# Patient Record
Sex: FEMALE | Race: OTHER | URBAN - METROPOLITAN AREA
[De-identification: names, ages, dates, MRNs, and addresses within clinical notes are randomized per-mention and may not be internally consistent; named-entity substitution may affect disease eponyms.]

---

## 2023-05-30 ENCOUNTER — OFFICE VISIT (OUTPATIENT)
Dept: FAMILY MEDICINE CLINIC | Facility: CLINIC | Age: 23
End: 2023-05-30

## 2023-05-30 VITALS
SYSTOLIC BLOOD PRESSURE: 110 MMHG | RESPIRATION RATE: 16 BRPM | DIASTOLIC BLOOD PRESSURE: 67 MMHG | WEIGHT: 129 LBS | BODY MASS INDEX: 24.99 KG/M2 | TEMPERATURE: 98 F | HEART RATE: 78 BPM | HEIGHT: 60.24 IN

## 2023-05-30 DIAGNOSIS — R09.81 NASAL CONGESTION: ICD-10-CM

## 2023-05-30 DIAGNOSIS — Z01.419 ENCOUNTER FOR ANNUAL ROUTINE GYNECOLOGICAL EXAMINATION: ICD-10-CM

## 2023-05-30 DIAGNOSIS — Z00.00 ROUTINE PHYSICAL EXAMINATION: Primary | ICD-10-CM

## 2023-05-30 PROCEDURE — 3074F SYST BP LT 130 MM HG: CPT | Performed by: FAMILY MEDICINE

## 2023-05-30 PROCEDURE — 3008F BODY MASS INDEX DOCD: CPT | Performed by: FAMILY MEDICINE

## 2023-05-30 PROCEDURE — 99385 PREV VISIT NEW AGE 18-39: CPT | Performed by: FAMILY MEDICINE

## 2023-05-30 PROCEDURE — 3078F DIAST BP <80 MM HG: CPT | Performed by: FAMILY MEDICINE

## 2023-05-30 RX ORDER — GARLIC EXTRACT 500 MG
1 CAPSULE ORAL DAILY
COMMUNITY

## 2023-05-31 LAB
C TRACH DNA SPEC QL NAA+PROBE: NEGATIVE
N GONORRHOEA DNA SPEC QL NAA+PROBE: NEGATIVE

## 2023-06-09 LAB
LAST PAP RESULT: NORMAL
PAP HISTORY (OTHER THAN LAST PAP): NORMAL

## 2023-06-13 ENCOUNTER — OFFICE VISIT (OUTPATIENT)
Dept: FAMILY MEDICINE CLINIC | Facility: CLINIC | Age: 23
End: 2023-06-13

## 2023-06-13 VITALS
SYSTOLIC BLOOD PRESSURE: 112 MMHG | HEART RATE: 80 BPM | TEMPERATURE: 98 F | DIASTOLIC BLOOD PRESSURE: 71 MMHG | RESPIRATION RATE: 16 BRPM

## 2023-06-13 DIAGNOSIS — J02.9 PHARYNGITIS, UNSPECIFIED ETIOLOGY: Primary | ICD-10-CM

## 2023-06-13 LAB
CONTROL LINE PRESENT WITH A CLEAR BACKGROUND (YES/NO): YES YES/NO
KIT LOT #: NORMAL NUMERIC
STREP GRP A CUL-SCR: POSITIVE

## 2023-06-13 PROCEDURE — 87880 STREP A ASSAY W/OPTIC: CPT | Performed by: FAMILY MEDICINE

## 2023-06-13 PROCEDURE — 3074F SYST BP LT 130 MM HG: CPT | Performed by: FAMILY MEDICINE

## 2023-06-13 PROCEDURE — 99214 OFFICE O/P EST MOD 30 MIN: CPT | Performed by: FAMILY MEDICINE

## 2023-06-13 PROCEDURE — 3078F DIAST BP <80 MM HG: CPT | Performed by: FAMILY MEDICINE

## 2023-06-13 RX ORDER — AZITHROMYCIN 250 MG/1
TABLET, FILM COATED ORAL
Qty: 6 TABLET | Refills: 0 | Status: SHIPPED | OUTPATIENT
Start: 2023-06-13 | End: 2023-06-18

## 2023-08-11 ENCOUNTER — TELEPHONE (OUTPATIENT)
Dept: OBGYN CLINIC | Facility: CLINIC | Age: 23
End: 2023-08-11

## 2023-08-11 NOTE — TELEPHONE ENCOUNTER
New Pt made 15-min appt in [facility] on 8/22 to take out my nexplanon implant and switch over to a non-hormonal BC pill.      Pls advise if an issue

## 2023-08-11 NOTE — TELEPHONE ENCOUNTER
Name and  verified. Spoke with patient and told her we needed 30 minute slot for appointment. Patient rescheduled for  at 1:45. Patient verbalizes understanding.

## 2023-08-21 ENCOUNTER — OFFICE VISIT (OUTPATIENT)
Dept: FAMILY MEDICINE CLINIC | Facility: CLINIC | Age: 23
End: 2023-08-21

## 2023-08-21 VITALS
TEMPERATURE: 98 F | DIASTOLIC BLOOD PRESSURE: 65 MMHG | RESPIRATION RATE: 16 BRPM | SYSTOLIC BLOOD PRESSURE: 100 MMHG | HEART RATE: 69 BPM | BODY MASS INDEX: 24 KG/M2 | WEIGHT: 125 LBS

## 2023-08-21 DIAGNOSIS — G43.109 MIGRAINE WITH AURA AND WITHOUT STATUS MIGRAINOSUS, NOT INTRACTABLE: Primary | ICD-10-CM

## 2023-08-21 PROCEDURE — 99214 OFFICE O/P EST MOD 30 MIN: CPT | Performed by: FAMILY MEDICINE

## 2023-08-21 PROCEDURE — 3074F SYST BP LT 130 MM HG: CPT | Performed by: FAMILY MEDICINE

## 2023-08-21 PROCEDURE — 3078F DIAST BP <80 MM HG: CPT | Performed by: FAMILY MEDICINE

## 2023-08-21 RX ORDER — SUMATRIPTAN 25 MG/1
25 TABLET, FILM COATED ORAL EVERY 2 HOUR PRN
Qty: 9 TABLET | Refills: 1 | Status: SHIPPED | OUTPATIENT
Start: 2023-08-21

## 2023-08-21 RX ORDER — CYCLOBENZAPRINE HCL 5 MG
5 TABLET ORAL NIGHTLY PRN
Qty: 14 TABLET | Refills: 0 | Status: SHIPPED | OUTPATIENT
Start: 2023-08-21

## 2023-08-22 ENCOUNTER — OFFICE VISIT (OUTPATIENT)
Dept: OBGYN CLINIC | Facility: CLINIC | Age: 23
End: 2023-08-22

## 2023-08-22 VITALS
WEIGHT: 125 LBS | HEART RATE: 77 BPM | HEIGHT: 60 IN | BODY MASS INDEX: 24.54 KG/M2 | SYSTOLIC BLOOD PRESSURE: 118 MMHG | DIASTOLIC BLOOD PRESSURE: 77 MMHG

## 2023-08-22 DIAGNOSIS — Z30.46 ENCOUNTER FOR REMOVAL OF SUBDERMAL CONTRACEPTIVE IMPLANT: Primary | ICD-10-CM

## 2023-08-22 DIAGNOSIS — Z30.09 GENERAL COUNSELING FOR PRESCRIPTION OF ORAL CONTRACEPTIVES: ICD-10-CM

## 2023-08-22 PROCEDURE — 3074F SYST BP LT 130 MM HG: CPT | Performed by: ADVANCED PRACTICE MIDWIFE

## 2023-08-22 PROCEDURE — 11982 REMOVE DRUG IMPLANT DEVICE: CPT | Performed by: ADVANCED PRACTICE MIDWIFE

## 2023-08-22 PROCEDURE — 3078F DIAST BP <80 MM HG: CPT | Performed by: ADVANCED PRACTICE MIDWIFE

## 2023-08-22 PROCEDURE — 3008F BODY MASS INDEX DOCD: CPT | Performed by: ADVANCED PRACTICE MIDWIFE

## 2023-08-22 RX ORDER — NORETHINDRONE ACETATE AND ETHINYL ESTRADIOL .03; 1.5 MG/1; MG/1
1 TABLET ORAL DAILY
Qty: 28 TABLET | Refills: 11 | Status: SHIPPED | OUTPATIENT
Start: 2023-08-22 | End: 2024-08-21

## 2023-08-22 NOTE — PROCEDURES
Nexplanon Removal      Birth control method(s) used:  nexplanon   Verbal Consent was obtained from the patient. LMP 8/13 wnl    Removal:  1 % lidocaine was injected underneath the tip of the Nexplanon dick that is closest to the left elbow. Nexplanon was located by palpation. 3 mm incision was made at the tip of the dick closest to the elbow. Nexplanon was pushed toward the incision. Nexplanon dick was bluntly dissected from the tissue sheath. The entire Nexplanon dick was removed. Visit Plan:  Steri-Strips were applied to the skin incision. An Ace bandage was wrapped around the injected arm. Both Physician and pt confirmed device by tactile feel. Patient was instructed to remove Ace bandage in 24 hours. Patient was instructed to remove Steri-Strips in 7 days. Risks of OCP's especially DVT, elevated blood pressure, and failure resulting in pregnancy were reviewed. Increased risk for heart attack and stroke especially with tobacco use was also discussed. Potential side effects and non-contraceptive benefits were reviewed. She was also counseled on the use of condoms to decrease the risk of pregnancy and sexually transmitted diseases. \"ACHES\" reviewed    Diagnoses and all orders for this visit:    Encounter for removal of subdermal contraceptive implant  -     REMOVAL, NON-BIODEGRADABLE DRUG DELIVERY IMPLANT    General counseling for prescription of oral contraceptives    Other orders  -     Norethindrone Acet-Ethinyl Est (Winston Arias 1.5/30, 21,) 1.5-30 MG-MCG Oral Tab; Take 1 tablet by mouth daily.

## 2023-09-07 ENCOUNTER — TELEPHONE (OUTPATIENT)
Dept: OBGYN CLINIC | Facility: CLINIC | Age: 23
End: 2023-09-07

## 2023-09-07 RX ORDER — NORETHINDRONE ACETATE AND ETHINYL ESTRADIOL .03; 1.5 MG/1; MG/1
1 TABLET ORAL DAILY
Qty: 28 TABLET | Refills: 11 | OUTPATIENT
Start: 2023-09-07 | End: 2024-09-06

## 2023-10-10 ENCOUNTER — OFFICE VISIT (OUTPATIENT)
Dept: OBGYN CLINIC | Facility: CLINIC | Age: 23
End: 2023-10-10

## 2023-10-10 VITALS
HEART RATE: 67 BPM | HEIGHT: 60 IN | SYSTOLIC BLOOD PRESSURE: 110 MMHG | WEIGHT: 123 LBS | BODY MASS INDEX: 24.15 KG/M2 | DIASTOLIC BLOOD PRESSURE: 76 MMHG

## 2023-10-10 DIAGNOSIS — Z32.00 PREGNANCY EXAMINATION OR TEST, PREGNANCY UNCONFIRMED: ICD-10-CM

## 2023-10-10 DIAGNOSIS — Z30.017 INSERTION OF IMPLANTABLE SUBDERMAL CONTRACEPTIVE: Primary | ICD-10-CM

## 2023-10-10 LAB
CONTROL LINE PRESENT WITH A CLEAR BACKGROUND (YES/NO): YES YES/NO
PREGNANCY TEST, URINE: NEGATIVE

## 2023-10-10 PROCEDURE — 81025 URINE PREGNANCY TEST: CPT | Performed by: ADVANCED PRACTICE MIDWIFE

## 2023-10-10 PROCEDURE — 3078F DIAST BP <80 MM HG: CPT | Performed by: ADVANCED PRACTICE MIDWIFE

## 2023-10-10 PROCEDURE — 3008F BODY MASS INDEX DOCD: CPT | Performed by: ADVANCED PRACTICE MIDWIFE

## 2023-10-10 PROCEDURE — 11981 INSERTION DRUG DLVR IMPLANT: CPT | Performed by: ADVANCED PRACTICE MIDWIFE

## 2023-10-10 PROCEDURE — 3074F SYST BP LT 130 MM HG: CPT | Performed by: ADVANCED PRACTICE MIDWIFE

## 2023-10-10 NOTE — PROCEDURES
Nexplanon Insertion/Removal    Pregnancy Results: negative from urine test   Birth control method(s) used:  ; OCPs   Consent was obtained from the patient. Insertion:    The patient was positioned with her left arm flexed. Measurement was taken from her epicondyle approximately 8 cm and marked 4 cm apart from the orginal gertrude. 1% lidocaine with epinephrine  was used to inject the planned insertion site. Device opened, dick confirmed within device. Lateral traction of skin performed while Nexplanon inserted. The Nexplanon was placed 8 cm from the epicondyle without difficulty. The ridged portion of the applicator was seen once the device was withdrawn. Visit Plan:  Steri-Strips were applied to the skin incision. An Ace bandage was wrapped around the injected arm. Both Physician and pt confirmed device by tactile feel. Patient was instructed to remove Ace bandage in 24 hours. Patient was instructed to remove Steri-Strips in 7 days. All of the patient's questions were addressed.   Nexplanon info card was given to the patient with expiration

## 2024-03-04 RX ORDER — SUMATRIPTAN 25 MG/1
25 TABLET, FILM COATED ORAL EVERY 2 HOUR PRN
Qty: 9 TABLET | Refills: 1 | Status: SHIPPED | OUTPATIENT
Start: 2024-03-04

## 2024-03-05 NOTE — TELEPHONE ENCOUNTER
Refill passed per [facility] protocol.     Requested Prescriptions   Pending Prescriptions Disp Refills    SUMAtriptan (IMITREX) 25 MG Oral Tab 9 tablet 1     Sig: Take 1 tablet (25 mg total) by mouth every 2 (two) hours as needed for Migraine. Use at onset; repeat once after 2 HRS-ONLY 2 IN 24 HR MAX       Neurology Medications Passed - 3/2/2024  8:48 AM        Passed - In person appointment or virtual visit in the past 6 mos or appointment in next 3 mos     Recent Outpatient Visits              4 months ago [place]able subdermal [place]    [facility]    6 months ago Encounter for removal of subdermal contraceptive implant    [facility] Claudine Rodriguez CN    Office Visit    6 months ago Migraine with aura and without status migrainosus, not intractable    [facility] Weiler, Colleen M,     Office Visit    8 months ago Pharyngitis, unspecified etiology    [facility] Weiler, Colleen M,     Office Visit    9 months ago Routine physical examination    [facility] Weiler, Colleen M, DO    Office Visit          Future Appointments         Provider Department Appt Notes    In 1 week Weiler, Colleen M, DO [facility] I've been having trouble with my skin Lots of breakouts/acne which is extremely uncommon for me                     Future Appointments         Provider Department Appt Notes    In 1 week Weiler, Colleen M,  [facility] I've been having trouble with my skin Lots of breakouts/acne which is extremely uncommon for me             Recent Outpatient Visits              4 months ago Insertion of implantable subdermal contraceptive    [place]  [facility] Claudine Rodriguez CNM    Office Visit    6 months ago Encounter for removal of subdermal contraceptive implant    [facility] [place] [facility]    Office Visit    6 months ago Migraine with aura and without status migrainosus, not intractable    [facility] [place] [facility] [place] [place]    Office Visit    8 months ago Pharyngitis, unspecified etiology    [facility] [place] [facility] [place] M, DO    Office Visit    9 months ago Routine physical examination    [facility] [place] [facility] [place] Col[facility] M, DO    Office Visit

## 2024-03-12 ENCOUNTER — OFFICE VISIT (OUTPATIENT)
Dept: FAMILY MEDICINE CLINIC | Facility: CLINIC | Age: 24
End: 2024-03-12
Payer: MEDICAID

## 2024-03-12 ENCOUNTER — LAB ENCOUNTER (OUTPATIENT)
Dept: LAB | Age: 24
End: 2024-03-12
Attending: FAMILY MEDICINE
Payer: MEDICAID

## 2024-03-12 VITALS
RESPIRATION RATE: 16 BRPM | SYSTOLIC BLOOD PRESSURE: 111 MMHG | BODY MASS INDEX: 24 KG/M2 | HEART RATE: 78 BPM | DIASTOLIC BLOOD PRESSURE: 74 MMHG | WEIGHT: 121 LBS | TEMPERATURE: 98 F

## 2024-03-12 DIAGNOSIS — F41.9 ANXIETY: ICD-10-CM

## 2024-03-12 DIAGNOSIS — L70.0 ACNE VULGARIS: Primary | ICD-10-CM

## 2024-03-12 DIAGNOSIS — R63.4 WEIGHT LOSS: ICD-10-CM

## 2024-03-12 DIAGNOSIS — M54.2 NECK PAIN: ICD-10-CM

## 2024-03-12 LAB
ANION GAP SERPL CALC-SCNC: 7 MMOL/L (ref 0–18)
BUN BLD-MCNC: 9 MG/DL (ref 9–23)
BUN/CREAT SERPL: 11.4 (ref 10–20)
CALCIUM BLD-MCNC: 9.8 MG/DL (ref 8.7–10.4)
CHLORIDE SERPL-SCNC: 108 MMOL/L (ref 98–112)
CO2 SERPL-SCNC: 26 MMOL/L (ref 21–32)
CREAT BLD-MCNC: 0.79 MG/DL
DEPRECATED RDW RBC AUTO: 38.5 FL (ref 35.1–46.3)
EGFRCR SERPLBLD CKD-EPI 2021: 106 ML/MIN/1.73M2 (ref 60–?)
ERYTHROCYTE [DISTWIDTH] IN BLOOD BY AUTOMATED COUNT: 11.9 % (ref 11–15)
FASTING STATUS PATIENT QL REPORTED: YES
GLUCOSE BLD-MCNC: 89 MG/DL (ref 70–99)
HCT VFR BLD AUTO: 40.7 %
HGB BLD-MCNC: 13.8 G/DL
MCH RBC QN AUTO: 29.7 PG (ref 26–34)
MCHC RBC AUTO-ENTMCNC: 33.9 G/DL (ref 31–37)
MCV RBC AUTO: 87.7 FL
OSMOLALITY SERPL CALC.SUM OF ELEC: 290 MOSM/KG (ref 275–295)
PLATELET # BLD AUTO: 319 10(3)UL (ref 150–450)
POTASSIUM SERPL-SCNC: 4 MMOL/L (ref 3.5–5.1)
RBC # BLD AUTO: 4.64 X10(6)UL
SODIUM SERPL-SCNC: 141 MMOL/L (ref 136–145)
TSI SER-ACNC: 1.75 MIU/ML (ref 0.55–4.78)
VIT D+METAB SERPL-MCNC: 14 NG/ML (ref 30–100)
WBC # BLD AUTO: 4.5 X10(3) UL (ref 4–11)

## 2024-03-12 PROCEDURE — 82306 VITAMIN D 25 HYDROXY: CPT

## 2024-03-12 PROCEDURE — 84443 ASSAY THYROID STIM HORMONE: CPT

## 2024-03-12 PROCEDURE — 90715 TDAP VACCINE 7 YRS/> IM: CPT | Performed by: FAMILY MEDICINE

## 2024-03-12 PROCEDURE — 90471 IMMUNIZATION ADMIN: CPT | Performed by: FAMILY MEDICINE

## 2024-03-12 PROCEDURE — 85027 COMPLETE CBC AUTOMATED: CPT

## 2024-03-12 PROCEDURE — 80048 BASIC METABOLIC PNL TOTAL CA: CPT

## 2024-03-12 PROCEDURE — 36415 COLL VENOUS BLD VENIPUNCTURE: CPT

## 2024-03-12 PROCEDURE — 99214 OFFICE O/P EST MOD 30 MIN: CPT | Performed by: FAMILY MEDICINE

## 2024-03-12 NOTE — PROGRESS NOTES
HPI: Kalyn is a 23 year old female who presents for acne for the past few months.  New problem for her.  Using Cerave daily.     Pt has noticed she has been losing more weight.  Has not changed the way she eats. Has not changed exercise routine.  Weight trains 4 times a week.     Feels like something is off.     On Nexplanon. Does not get periods.     Has increased stress.  Has more work responsibility. Getting more tension on the left side of neck. Pt is interested in talking to someone.  Affecting her day to day activities. Unsure if she needs medication.     PMH:  No past medical history on file.   Alg:  Patient has no known allergies.   Meds:   Current Outpatient Medications on File Prior to Visit   Medication Sig Dispense Refill    SUMAtriptan (IMITREX) 25 MG Oral Tab Take 1 tablet (25 mg total) by mouth every 2 (two) hours as needed for Migraine. Use at onset; repeat once after 2 HRS-ONLY 2 IN 24 HR MAX 9 tablet 1     No current facility-administered medications on file prior to visit.      Tobacco Use: no    ROS: see HPI    Objective:   Gen: AOx3. NAD.   /74   Pulse 78   Temp 98.4 °F (36.9 °C) (Temporal)   Resp 16   Wt 121 lb (54.9 kg)   LMP 08/16/2023   BMI 23.63 kg/m²   Neck: no LAD noted  CV:  Regular rate and rhythm; no murmurs  Lungs:  Clear to ausculation; good aeration               No wheezes, rales or rhonchi      Assessment:/Plan:  Encounter Diagnoses   Name Primary?    Acne vulgaris    New breakouts.  Advised product with benzyl peroxide. Refer to Dermatology placed.   Yes    Weight loss    Unclear etiology.  No other concerning s/s. Will check labs.        Neck pain    Seems to be contributing to headaches.  Will get PT on neck and shoulders.        Anxiety    Worsening due to stress at work.  Will refer to [facility] for therapy.       Colleen Weiler,

## 2024-03-15 DIAGNOSIS — E55.9 VITAMIN D DEFICIENCY: Primary | ICD-10-CM

## 2024-03-15 RX ORDER — ERGOCALCIFEROL 1.25 MG/1
50000 CAPSULE ORAL WEEKLY
Qty: 8 CAPSULE | Refills: 0 | Status: SHIPPED | OUTPATIENT
Start: 2024-03-15 | End: 2024-04-14

## 2024-06-03 ENCOUNTER — OFFICE VISIT (OUTPATIENT)
Dept: FAMILY MEDICINE CLINIC | Facility: CLINIC | Age: 24
End: 2024-06-03

## 2024-06-03 VITALS
TEMPERATURE: 98 F | BODY MASS INDEX: 27 KG/M2 | RESPIRATION RATE: 16 BRPM | HEART RATE: 77 BPM | SYSTOLIC BLOOD PRESSURE: 107 MMHG | WEIGHT: 137 LBS | DIASTOLIC BLOOD PRESSURE: 68 MMHG

## 2024-06-03 DIAGNOSIS — J02.0 STREP PHARYNGITIS: Primary | ICD-10-CM

## 2024-06-03 PROCEDURE — 99214 OFFICE O/P EST MOD 30 MIN: CPT | Performed by: FAMILY MEDICINE

## 2024-06-03 PROCEDURE — 87880 STREP A ASSAY W/OPTIC: CPT | Performed by: FAMILY MEDICINE

## 2024-06-03 RX ORDER — SUMATRIPTAN 25 MG/1
25 TABLET, FILM COATED ORAL EVERY 2 HOUR PRN
Qty: 9 TABLET | Refills: 1 | Status: SHIPPED | OUTPATIENT
Start: 2024-06-03

## 2024-06-03 RX ORDER — AMOXICILLIN 500 MG/1
500 CAPSULE ORAL 2 TIMES DAILY
Qty: 20 CAPSULE | Refills: 0 | Status: SHIPPED | OUTPATIENT
Start: 2024-06-03 | End: 2024-06-13

## 2024-06-03 NOTE — PROGRESS NOTES
HPI: Kalyn is a 23 year old female who presents for pharyngitis. Started few days ago. Now has mild fevers. No other symptoms.     PMH:  No past medical history on file.   Alg:  Patient has no known allergies.   Meds:   Current Outpatient Medications on File Prior to Visit   Medication Sig Dispense Refill    SUMAtriptan (IMITREX) 25 MG Oral Tab Take 1 tablet (25 mg total) by mouth every 2 (two) hours as needed for Migraine. Use at onset; repeat once after 2 HRS-ONLY 2 IN 24 HR MAX 9 tablet 1     No current facility-administered medications on file prior to visit.      Tobacco Use: no    Objective:   Gen: AOx3. NAD.  /68   Pulse 77   Temp 98.4 °F (36.9 °C) (Temporal)   Resp 16   Wt 137 lb (62.1 kg)   LMP 08/16/2023   BMI 26.76 kg/m²   HEENT: Conjunctive clear.  Guerrero ear canals clear.  Guerrero TMs intact with good landmarks noted.  Nares patent.  Oral mucous membrane moist.  Normal lips, teeth, and gums.  Oropharynx erythematous.   Neck supple.  Good ROM.  No LAD.   CV:  Regular rate and rhythm; no murmurs  Lungs:  Clear to ausculation; good aeration               No wheezes, rales or rhonchi        Assessment:/Plan:  Encounter Diagnosis   Name Primary?    Strep pharyngitis Yes       Rapid strep positive. Start Amoxicillin.  Encouraged fluids.  Tylenol/Motrin for pain relief and fever.  Soft foods, cool liquids.  Patient is contagious until on antibiotics for 24 hours.  No sharing foods or liquids.  After 24 hours on antibiotics, dispose of toothbrush.  F/U if worsens.    Colleen Weiler,

## 2024-06-06 ENCOUNTER — TELEPHONE (OUTPATIENT)
Dept: FAMILY MEDICINE CLINIC | Facility: CLINIC | Age: 24
End: 2024-06-06

## 2024-06-06 NOTE — TELEPHONE ENCOUNTER
Patient calling today, was seen 6/3/24 in clinic and tested positive for strep. She is 3 days in of taking amoxicillin. She woke up this morning with low grade fever 99.7 and she has been taking tylenol after this to help. She initially had fever when symptoms first started but then it went away. She was concerned today when fever came back. She is feeling ok. Now has runny nose, new symptoms since seen but no other complaints.     Advised fluids, rest and if fever spikes above 100 and is staying there with OTC meds and ABT then she should call us back. Also advised if any other symptoms develop, patient can call us so we can discuss plan. She will continue the ABT given.     Patient agreeable to give this some time. 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     FYI to PCP as update and in case any other recommendations to make.

## 2024-06-24 ENCOUNTER — TELEMEDICINE (OUTPATIENT)
Dept: FAMILY MEDICINE CLINIC | Facility: CLINIC | Age: 24
End: 2024-06-24

## 2024-06-24 DIAGNOSIS — N89.8 VAGINAL DISCHARGE: Primary | ICD-10-CM

## 2024-06-24 PROCEDURE — 99213 OFFICE O/P EST LOW 20 MIN: CPT | Performed by: FAMILY MEDICINE

## 2024-06-24 RX ORDER — FLUCONAZOLE 150 MG/1
150 TABLET ORAL ONCE
Qty: 2 TABLET | Refills: 0 | Status: SHIPPED | OUTPATIENT
Start: 2024-06-24 | End: 2024-06-24

## 2024-06-24 NOTE — PROGRESS NOTES
HPI: Kalyn is a 23 year old female who presents for yeast infection. Started few days ago as clumpy discharge which looked like cottage cheese.  Took Monistat for 3 days and she still has symptoms. Sexually active with one partner.  No new partners.     PMH:  No past medical history on file.   Alg:  Patient has no known allergies.   Meds:   Current Outpatient Medications on File Prior to Visit   Medication Sig Dispense Refill    SUMAtriptan (IMITREX) 25 MG Oral Tab Take 1 tablet (25 mg total) by mouth every 2 (two) hours as needed for Migraine. Use at onset; repeat once after 2 HRS-ONLY 2 IN 24 HR MAX 9 tablet 1     No current facility-administered medications on file prior to visit.      Tobacco Use: no    Objective:   Gen: AOx3. NAD  LMP 08/16/2023       Assessment:/Plan:  Encounter Diagnosis   Name Primary?    Vaginal discharge Yes       Suspect yeast infection.  Start Diflucan.  Repeat dose in 72 hours if no improvement.  If no improvement after that, schedule appointment so we can swab discharge and do STD testing.       Please note that the following visit was completed using two-way, real-time interactive audio and video communication.  This has been done in good eddie to provide continuity of care in the best interest of the provider-patient relationship, due to the ongoing public health crisis/national emergency and because of restrictions of visitation.  There are limitations of this visit as no physical exam could be performed.  Every conscious effort was taken to allow for sufficient and adequate time.  This billing was spent on reviewing labs, medications, radiology tests and decision making.  Appropriate medical decision-making and tests are ordered as detailed in the plan of care above.        Colleen Weiler, DO

## (undated) NOTE — LETTER
AUTHORIZATION FOR SURGICAL OPERATION OR OTHER PROCEDURE    1. I hereby authorize  Leela Solis CNM, and [facility] staff assigned to my case to perform the following operation and/or procedure at the [facility] [facility]:    Aaron FLORES_______________________________________________________________________      _______________________________________________________________________________________________    2. My physician has explained the nature and purpose of the operation or other procedure, possible alternative methods of treatment, the risks involved, and the possibility of complication to me. I acknowledge that no guarantee has been made as to the result that may be obtained. 3.  I recognize that, during the course of this operation, or other procedure, unforseen conditions may necessitate additional or different procedure than those listed above. I, therefore, further authorize and request that the above named physician, his/her physician assistants or designees perform such procedures as are, in his/her professional opinion, necessary and desirable. 4.  Any tissue or organs removed in the operation or other procedure may be disposed of by and at the discretion of the [facility] and [facility]. 5.  I understand that in the event of a medical emergency, I will be transported by local paramedics to [facility] or other hospital emergency department. 6.  I certify that I have read and fully understand the above consent to operation and/or other procedure. 7.  I acknowledge that my physician has explained sedation/analgesia administration to me including the risks and benefits. I consent to the administration of sedation/analgesia as may be necessary or desirable in the judgement of my physician. Witness signature: ___________________________________________________ Date:  ______/______/_____                    Time:  ________ A. M.  P.M. Patient Name:  ______________________________________________________  (please print)      Patient signature:  ___________________________________________________             Relationship to Patient:           []  Parent    Responsible person                          []  Spouse  In case of minor or                    [] Other  _____________   Incompetent name:  __________________________________________________                               (please print)      _____________      Responsible person  In case of minor or  Incompetent signature:  _______________________________________________    Statement of Physician  My signature below affirms that prior to the time of the procedure, I have explained to the patient and/or his/her guardian, the risks and benefits involved in the proposed treatment and any reasonable alternative to the proposed treatment. I have also explained the risks and benefits involved in the refusal of the proposed treatment and have answered the patient's questions.                         Date:  ______/______/_______  Provider                      Signature:  __________________________________________________________       Time:  ___________ A.M    P.M.

## (undated) NOTE — LETTER
6/13/2023          To Whom It May Concern:    Jonathan Jeffrey is currently under my medical care and may not return to work at this time. Please excuse Harmeet De Anda for 2 days. She may return to work on 6/16/2023. Activity is restricted as follows: none. If you require additional information please contact our office.         Sincerely,          Margaret Maradiaga DO          Document generated by:  Margaret Maradiaga DO